# Patient Record
Sex: FEMALE | Race: BLACK OR AFRICAN AMERICAN | NOT HISPANIC OR LATINO | Employment: FULL TIME | URBAN - METROPOLITAN AREA
[De-identification: names, ages, dates, MRNs, and addresses within clinical notes are randomized per-mention and may not be internally consistent; named-entity substitution may affect disease eponyms.]

---

## 2017-01-18 ENCOUNTER — OFFICE VISIT (OUTPATIENT)
Dept: NEUROSURGERY | Facility: CLINIC | Age: 22
End: 2017-01-18

## 2017-01-18 VITALS
HEIGHT: 62 IN | BODY MASS INDEX: 29.44 KG/M2 | HEART RATE: 82 BPM | SYSTOLIC BLOOD PRESSURE: 128 MMHG | DIASTOLIC BLOOD PRESSURE: 82 MMHG | WEIGHT: 160 LBS

## 2017-01-18 DIAGNOSIS — M50.30 DISC DISEASE, DEGENERATIVE, CERVICAL: Primary | ICD-10-CM

## 2017-01-18 DIAGNOSIS — R93.7 ABNORMAL MRI, SPINE: ICD-10-CM

## 2017-01-18 PROCEDURE — 99213 OFFICE O/P EST LOW 20 MIN: CPT | Performed by: NEUROLOGICAL SURGERY

## 2017-01-18 NOTE — PROGRESS NOTES
Subjective   Patient ID: Ruth Garcia is a 21 y.o. female is here today for follow-up of Chiari malformation with new MRI of the cervical spine.    At her last visit, physical therapy was recommended.     Today, she notes that she went for initial PT evaluation and notes that she does HEP at home.  She notes that she is not currently having neck pain issues at this time.     Neck Pain    This is a chronic problem. The problem has been resolved. Pertinent negatives include no fever.       The following portions of the patient's history were reviewed and updated as appropriate: allergies, current medications, past family history, past medical history, past social history, past surgical history and problem list.    Review of Systems   Constitutional: Negative for fever.   Musculoskeletal: Positive for neck pain.   Neurological: Negative for syncope.   All other systems reviewed and are negative.    There was suspicion that this patient might have a true syrinx.  The repeat MRI was reviewed with the patient.  It really hasn't changed since 2013.  The linear streak may just represent an enlarged central canal but not a true syrinx.  It was always an incidental finding anyway.  The reason for her neck pain and intermittent numbness and tingling was the disc disease which also was unchanged.  She is actually asymptomatic right now the symptoms seem to have resolved on her own.  She went to physical therapy briefly that showed her exercises including the chin tuck.  I encouraged her to continue this.  I reassured her that the enlarged central canal was not anything to worry about.  But I did tell her that symptoms could recur in the neck and significant way in the future.  If they do, she needs to come back to see me.  Otherwise we can keep it open-ended.      Objective   Physical Exam   Constitutional: She is oriented to person, place, and time. She appears well-developed and well-nourished.   HENT:   Head: Normocephalic  and atraumatic.   Eyes: Conjunctivae and EOM are normal. Pupils are equal, round, and reactive to light.   Fundoscopic exam:       The right eye shows no papilledema. The right eye shows venous pulsations.        The left eye shows no papilledema. The left eye shows venous pulsations.   Neck: Carotid bruit is not present.   Neurological: She is oriented to person, place, and time. She has a normal Finger-Nose-Finger Test and a normal Heel to Shin Test. Gait normal.   Reflex Scores:       Tricep reflexes are 2+ on the right side and 2+ on the left side.       Bicep reflexes are 2+ on the right side and 2+ on the left side.       Brachioradialis reflexes are 2+ on the right side and 2+ on the left side.       Patellar reflexes are 2+ on the right side and 2+ on the left side.       Achilles reflexes are 2+ on the right side and 2+ on the left side.  Psychiatric: Her speech is normal.     Neurologic Exam     Mental Status   Oriented to person, place, and time.   Registration of memory: Good recent and remote memory.   Attention: normal. Concentration: normal.   Speech: speech is normal   Level of consciousness: alert  Knowledge: consistent with education.     Cranial Nerves     CN II   Visual fields full to confrontation.   Visual acuity: normal    CN III, IV, VI   Pupils are equal, round, and reactive to light.  Extraocular motions are normal.     CN V   Facial sensation intact.   Right corneal reflex: normal  Left corneal reflex: normal    CN VII   Facial expression full, symmetric.   Right facial weakness: none  Left facial weakness: none    CN VIII   Hearing: intact    CN IX, X   Palate: symmetric    CN XI   Right sternocleidomastoid strength: normal  Left sternocleidomastoid strength: normal    CN XII   Tongue: not atrophic  Tongue deviation: none    Motor Exam   Muscle bulk: normal  Right arm tone: normal  Left arm tone: normal  Right leg tone: normal  Left leg tone: normal    Strength   Strength 5/5 except as  noted.     Sensory Exam   Light touch normal.     Gait, Coordination, and Reflexes     Gait  Gait: normal    Coordination   Finger to nose coordination: normal  Heel to shin coordination: normal    Reflexes   Right brachioradialis: 2+  Left brachioradialis: 2+  Right biceps: 2+  Left biceps: 2+  Right triceps: 2+  Left triceps: 2+  Right patellar: 2+  Left patellar: 2+  Right achilles: 2+  Left achilles: 2+  Right : 2+  Left : 2+      Assessment/Plan   Independent Review of Radiographic Studies:    The recently completed brain MRI was normal.  There is no Chiari malformation.  The cervical MRI did show some disc disease at C3-C4 and a linear streak at the C6-C7 level that didn't really need to criteria for an actual syrinx.  It may be a slightly enlarged central canal.  In any event there was actually no change since 2013 MRI.  I agree with the reports.      Medical Decision Making:    I reassured her about the linear streak and that she doesn't have a syrinx.  I don't think we need to routinely follow this on MRI.  As for the symptoms associated with the disc disease, she is asymptomatic right now.  I told her though symptoms could recur and if they do in a significant way, she needs to return to the office.  Otherwise I encouraged her to continue the exercises that she learned in therapy and do them on a daily basis.      Ruth was seen today for chiari malformation and neck pain.    Diagnoses and all orders for this visit:    Disc disease, degenerative, cervical    Abnormal MRI, spine    No Follow-up on file.

## 2017-01-18 NOTE — LETTER
January 18, 2017     TEMITOPE Dumont  [address]  [address]  [facility] [address]    Patient: Ruth Garcia   YOB: 1995   Date of Visit: 1/18/2017       Dear TEMITOPE Matthews:    Ruth Garcia was in my office today. Below are the relevant portions of my assessment and plan of care.      Independent Review of Radiographic Studies:    The recently completed brain MRI was normal.  There is no Chiari malformation.  The cervical MRI did show some disc disease at C3-C4 and a linear streak at the C6-C7 level that didn't really need to criteria for an actual syrinx.  It may be a slightly enlarged central canal.  In any event there was actually no change since 2013 MRI.  I agree with the reports.      Medical Decision Making:    I reassured her about the linear streak and that she doesn't have a syrinx.  I don't think we need to routinely follow this on MRI.  As for the symptoms associated with the disc disease, she is asymptomatic right now.  I told her though symptoms could recur and if they do in a significant way, she needs to return to the office.  Otherwise I encouraged her to continue the exercises that she learned in therapy and do them on a daily basis.      Ruth was seen today for chiari malformation and neck pain.    Diagnoses and all orders for this visit:    Disc disease, degenerative, cervical    Abnormal MRI, spine    No Follow-up on file.            If you have questions, please do not hesitate to call me. I look forward to following Ruth along with you.         Sincerely,        Faustino Dejesus MD        CC: No Recipients

## 2017-03-28 ENCOUNTER — OFFICE VISIT (OUTPATIENT)
Dept: INTERNAL MEDICINE | Facility: CLINIC | Age: 22
End: 2017-03-28

## 2017-03-28 VITALS
OXYGEN SATURATION: 98 % | DIASTOLIC BLOOD PRESSURE: 76 MMHG | HEART RATE: 84 BPM | SYSTOLIC BLOOD PRESSURE: 110 MMHG | WEIGHT: 182 LBS | HEIGHT: 62 IN | BODY MASS INDEX: 33.49 KG/M2

## 2017-03-28 DIAGNOSIS — F32.A DEPRESSION, UNSPECIFIED DEPRESSION TYPE: ICD-10-CM

## 2017-03-28 DIAGNOSIS — F41.9 ANXIETY: ICD-10-CM

## 2017-03-28 PROCEDURE — 99213 OFFICE O/P EST LOW 20 MIN: CPT | Performed by: NURSE PRACTITIONER

## 2017-03-28 RX ORDER — ALPRAZOLAM 0.5 MG/1
0.5 TABLET ORAL NIGHTLY PRN
Qty: 30 TABLET | Refills: 0 | Status: SHIPPED | OUTPATIENT
Start: 2017-03-28 | End: 2019-05-23 | Stop reason: SDUPTHER

## 2017-03-28 NOTE — PROGRESS NOTES
Subjective   Ruth Garcia is a 21 y.o. female. Patient is here today for   Chief Complaint   Patient presents with   • Anxiety     Pt here to follow up on anxiety/depression. Pt doing well. Pt requesting rf on zoloft 50mg once daily & xanax 0.5mg once as needed.    .    History of Present Illness   Patient is here to f/u on anxiety and depression. She is currently on zoloft 50 mg and going to counseling once a month at this time. Her counselor suggested possibly increasing her dosage, but the patient feels fine with the dosage that she is on. She uses xanax on a rare occasion and is needing a refill on that today. Denies problems with sleep, appetite.     The following portions of the patient's history were reviewed and updated as appropriate: allergies, current medications, past family history, past medical history, past social history, past surgical history and problem list.    Review of Systems   Constitutional: Negative.    Respiratory: Negative.    Cardiovascular: Negative.    Psychiatric/Behavioral: Negative for dysphoric mood, self-injury, sleep disturbance and suicidal ideas. The patient is not nervous/anxious.        Objective   Vitals:    03/28/17 0803   BP: 110/76   Pulse: 84   SpO2: 98%     Physical Exam   Constitutional: Vital signs are normal. She appears well-developed and well-nourished.   Cardiovascular: Normal rate, regular rhythm and normal heart sounds.    Pulmonary/Chest: Effort normal and breath sounds normal.   Skin: Skin is warm and dry.   Psychiatric: She has a normal mood and affect. Her speech is normal and behavior is normal. Thought content normal.   Nursing note and vitals reviewed.      Assessment/Plan   Ruth was seen today for anxiety.    Diagnoses and all orders for this visit:    Depression, unspecified depression type  -     sertraline (ZOLOFT) 50 MG tablet; Take 1 tablet by mouth Daily.    Anxiety  -     sertraline (ZOLOFT) 50 MG tablet; Take 1 tablet by mouth Daily.  -      ALPRAZolam (XANAX) 0.5 MG tablet; Take 1 tablet by mouth At Night As Needed for Anxiety.      As part of this patient's treatment plan I am prescribing controlled substances.  The patient has been made aware of appropriate use of such medications, including potential risk of somnolence. Limited ability to drive and/or work safely, and potential for dependence or overdose.  It has also been made clear that these medications are for use by this patient only, without concomitant use of alcohol or other substances unless prescribed.  [facility] report has been reviewed and scanned into the patient's chart.

## 2018-02-20 DIAGNOSIS — F41.9 ANXIETY: ICD-10-CM

## 2018-02-20 DIAGNOSIS — F32.A DEPRESSION, UNSPECIFIED DEPRESSION TYPE: ICD-10-CM

## 2018-03-28 ENCOUNTER — OFFICE VISIT (OUTPATIENT)
Dept: INTERNAL MEDICINE | Facility: CLINIC | Age: 23
End: 2018-03-28

## 2018-03-28 VITALS
WEIGHT: 192 LBS | BODY MASS INDEX: 35.33 KG/M2 | DIASTOLIC BLOOD PRESSURE: 72 MMHG | HEIGHT: 62 IN | HEART RATE: 86 BPM | OXYGEN SATURATION: 98 % | SYSTOLIC BLOOD PRESSURE: 102 MMHG | TEMPERATURE: 98.8 F

## 2018-03-28 DIAGNOSIS — F32.A DEPRESSION, UNSPECIFIED DEPRESSION TYPE: Primary | ICD-10-CM

## 2018-03-28 DIAGNOSIS — Z00.00 HEALTHCARE MAINTENANCE: ICD-10-CM

## 2018-03-28 DIAGNOSIS — F41.9 ANXIETY: ICD-10-CM

## 2018-03-28 LAB
25(OH)D3+25(OH)D2 SERPL-MCNC: 19.1 NG/ML (ref 30–100)
ALBUMIN SERPL-MCNC: 4.2 G/DL (ref 3.5–5.2)
ALBUMIN/GLOB SERPL: 1.2 G/DL
ALP SERPL-CCNC: 68 U/L (ref 39–117)
ALT SERPL-CCNC: 12 U/L (ref 1–33)
AST SERPL-CCNC: 18 U/L (ref 1–32)
BILIRUB SERPL-MCNC: 0.2 MG/DL (ref 0.1–1.2)
BUN SERPL-MCNC: 10 MG/DL (ref 6–20)
BUN/CREAT SERPL: 11.1 (ref 7–25)
CALCIUM SERPL-MCNC: 9.1 MG/DL (ref 8.6–10.5)
CHLORIDE SERPL-SCNC: 103 MMOL/L (ref 98–107)
CHOLEST SERPL-MCNC: 196 MG/DL (ref 0–200)
CHOLEST/HDLC SERPL: 4.17 {RATIO}
CO2 SERPL-SCNC: 26.9 MMOL/L (ref 22–29)
CREAT SERPL-MCNC: 0.9 MG/DL (ref 0.57–1)
GFR SERPLBLD CREATININE-BSD FMLA CKD-EPI: 77 ML/MIN/1.73
GFR SERPLBLD CREATININE-BSD FMLA CKD-EPI: 93 ML/MIN/1.73
GLOBULIN SER CALC-MCNC: 3.4 GM/DL
GLUCOSE SERPL-MCNC: 84 MG/DL (ref 65–99)
HDLC SERPL-MCNC: 47 MG/DL (ref 40–60)
LDLC SERPL CALC-MCNC: 133 MG/DL (ref 0–100)
POTASSIUM SERPL-SCNC: 5 MMOL/L (ref 3.5–5.2)
PROT SERPL-MCNC: 7.6 G/DL (ref 6–8.5)
SODIUM SERPL-SCNC: 141 MMOL/L (ref 136–145)
TRIGL SERPL-MCNC: 81 MG/DL (ref 0–150)
VLDLC SERPL CALC-MCNC: 16.2 MG/DL (ref 5–40)

## 2018-03-28 PROCEDURE — 99213 OFFICE O/P EST LOW 20 MIN: CPT | Performed by: NURSE PRACTITIONER

## 2018-03-28 NOTE — PROGRESS NOTES
Subjective   Ruth Garcia is a 22 y.o. female. Patient is here today for   Chief Complaint   Patient presents with   • Anxiety     Pt here to follow up on Anxiety/Depression. Pt does not need a rf on any of her meds at this time.    .    History of Present Illness   Patient is here to follow up on anxiety and depression. She is doing well with Zoloft. She hasn't had to take any xanax. Denies any other concerns.     She is fasting today for labs.    The following portions of the patient's history were reviewed and updated as appropriate: allergies, current medications, past family history, past medical history, past social history, past surgical history and problem list.    Review of Systems   Constitutional: Negative.    HENT: Negative.    Respiratory: Negative.    Cardiovascular: Negative.    Psychiatric/Behavioral: Negative.        Objective   Vitals:    03/28/18 0743   BP: 102/72   Pulse: 86   Temp: 98.8 °F (37.1 °C)   SpO2: 98%     Physical Exam   Constitutional: Vital signs are normal. She appears well-developed and well-nourished.   HENT:   Right Ear: Tympanic membrane and ear canal normal.   Left Ear: Tympanic membrane and ear canal normal.   Mouth/Throat: Oropharynx is clear and moist and mucous membranes are normal.   Cardiovascular: Normal rate, regular rhythm and normal heart sounds.    Pulmonary/Chest: Effort normal and breath sounds normal.   Skin: Skin is warm, dry and intact.   Psychiatric: She has a normal mood and affect. Her speech is normal and behavior is normal. Thought content normal.   Nursing note and vitals reviewed.      Assessment/Plan   Ruth was seen today for anxiety.    Diagnoses and all orders for this visit:    Depression, unspecified depression type    Anxiety    Healthcare maintenance  -     Comprehensive Metabolic Panel  -     Lipid Panel With / Chol / HDL Ratio  -     Vitamin D 25 Hydroxy      Patient doesn't need refills at this time. Will call with lab results. F/u in one  year.

## 2018-04-13 ENCOUNTER — TELEPHONE (OUTPATIENT)
Dept: INTERNAL MEDICINE | Facility: CLINIC | Age: 23
End: 2018-04-13

## 2018-04-13 DIAGNOSIS — R12 HEART BURN: Primary | ICD-10-CM

## 2018-04-13 DIAGNOSIS — K30 ACID INDIGESTION: ICD-10-CM

## 2018-04-13 NOTE — TELEPHONE ENCOUNTER
Pt called requesting to be referred to GI for indigestion & heartburn for the past 3-4 days. Pt has been trying something OTC but does still want to go forward with the referral for the time being.

## 2018-04-13 NOTE — TELEPHONE ENCOUNTER
----- Message from Jina Bourne sent at 4/13/2018  9:27 AM EDT -----  PT wants refferal for a Gi.     PHONE: [phone]

## 2018-05-02 ENCOUNTER — OFFICE VISIT (OUTPATIENT)
Dept: GASTROENTEROLOGY | Facility: CLINIC | Age: 23
End: 2018-05-02

## 2018-05-02 VITALS
TEMPERATURE: 98.2 F | WEIGHT: 190.8 LBS | SYSTOLIC BLOOD PRESSURE: 128 MMHG | HEIGHT: 62 IN | DIASTOLIC BLOOD PRESSURE: 84 MMHG | BODY MASS INDEX: 35.11 KG/M2

## 2018-05-02 DIAGNOSIS — R12 HEARTBURN: Primary | ICD-10-CM

## 2018-05-02 PROCEDURE — 99203 OFFICE O/P NEW LOW 30 MIN: CPT | Performed by: INTERNAL MEDICINE

## 2018-05-02 RX ORDER — OMEPRAZOLE 20 MG/1
20 CAPSULE, DELAYED RELEASE ORAL DAILY
COMMUNITY
End: 2019-05-24

## 2018-05-02 NOTE — PATIENT INSTRUCTIONS
Monitor symptoms    Gaviscon is a good antacid to use as needed    Pepcid is good as needed for more severe symptoms    Call if symptoms return or worsen, can check an esophagram (x-ray test)    For any additional questions, concerns or changes to your condition after today's office visit please contact the office at [phone].

## 2018-05-02 NOTE — PROGRESS NOTES
"Chief Complaint   Patient presents with   • Heartburn       Subjective     HPI    Ruth Garcia is a 22 y.o. female with a past medical history noted below who presents for evaluation of heartburn.  Symptoms started about 2 weeks ago.  She has had intermittent symptoms before but they usually resolved within a day and without any medication.  This time, symptoms lasted about 5 days.  Describes mid esophageal pain with swallowing.  Described as a \"punching\" sensation.  Worse with laying down but also during the day following meals.  Almost unbearable to her.  Associated with a strong urge to burp.  Felt that liquid was hitting something in her mid chest.  No associated nausea or vomiting.  No dysphagia or regurgitation  She started prilosec OTC and symptoms gradually resolved  She completed 2 weeks of this.    No excess NSAIDs.  No family history of GI malignancies.  No excessive ETOH.  Works as a  at [employer].  No abdominal surgeries.          Past Medical History:   Diagnosis Date   • Anxiety    • Depression          Current Outpatient Prescriptions:   •  ALPRAZolam (XANAX) 0.5 MG tablet, Take 1 tablet by mouth At Night As Needed for Anxiety., Disp: 30 tablet, Rfl: 0  •  norgestrel-ethinyl estradiol (LOW-OGESTREL,CRYSELLE) 0.3-30 MG-MCG per tablet, Take 1 tablet by mouth Daily., Disp: , Rfl:   •  omeprazole (priLOSEC) 20 MG capsule, Take 20 mg by mouth Daily., Disp: , Rfl:   •  sertraline (ZOLOFT) 50 MG tablet, TAKE 1 TABLET BY MOUTH DAILY., Disp: 90 tablet, Rfl: 0    No Known Allergies    Social History     Social History   • Marital status: Single     Spouse name: N/A   • Number of children: N/A   • Years of education: N/A     Occupational History   • Not on file.     Social History Main Topics   • Smoking status: Never Smoker   • Smokeless tobacco: Never Used   • Alcohol use Yes      Comment: RARE   • Drug use: No   • Sexual activity: Not on file     Other Topics Concern   • Not on file "     Social History Narrative   • No narrative on file       Family History   Problem Relation Age of Onset   • No Known Problems Mother    • No Known Problems Father        Review of Systems   Constitutional: Negative for activity change, appetite change and fatigue.   HENT: Negative for sore throat and trouble swallowing.    Respiratory: Negative.    Cardiovascular: Negative.    Gastrointestinal: Negative for abdominal distention, abdominal pain and blood in stool.        + heartburn   Endocrine: Negative for cold intolerance and heat intolerance.   Genitourinary: Negative for difficulty urinating, dysuria and frequency.   Musculoskeletal: Negative for arthralgias, back pain and myalgias.   Skin: Negative.    Hematological: Negative for adenopathy. Does not bruise/bleed easily.   All other systems reviewed and are negative.      Objective     Vitals:    05/02/18 1543   BP: 128/84   Temp: 98.2 °F (36.8 °C)     1    05/02/18  1543   Weight: 86.5 kg (190 lb 12.8 oz)     Body mass index is 34.9 kg/m².    Physical Exam   Constitutional: She is oriented to person, place, and time. She appears well-developed and well-nourished. No distress.   HENT:   Head: Normocephalic and atraumatic.   Right Ear: External ear normal.   Left Ear: External ear normal.   Nose: Nose normal.   Mouth/Throat: Oropharynx is clear and moist.   Eyes: Conjunctivae and EOM are normal. Right eye exhibits no discharge. Left eye exhibits no discharge. No scleral icterus.   Neck: Normal range of motion. Neck supple. No thyromegaly present.   No supraclavicular adenopathy   Cardiovascular: Normal rate, regular rhythm, normal heart sounds and intact distal pulses.  Exam reveals no gallop.    No murmur heard.  No lower extremity edema   Pulmonary/Chest: Effort normal and breath sounds normal. No respiratory distress. She has no wheezes.   Abdominal: Soft. Normal appearance and bowel sounds are normal. She exhibits no distension and no mass. There is no  hepatosplenomegaly. There is no tenderness. There is no rigidity, no rebound and no guarding. No hernia.   Genitourinary:   Genitourinary Comments: Rectal exam deferred   Musculoskeletal: Normal range of motion. She exhibits no edema or tenderness.   No atrophy of upper or lower extremities.  Normal digits and nails of both hands.   Lymphadenopathy:     She has no cervical adenopathy.   Neurological: She is alert and oriented to person, place, and time. She displays no atrophy. Coordination normal.   Skin: Skin is warm and dry. No rash noted. She is not diaphoretic. No erythema.   Psychiatric: She has a normal mood and affect. Her behavior is normal. Judgment and thought content normal.   Vitals reviewed.      WBC   Date Value Ref Range Status   09/29/2016 5.29 4.50 - 10.70 10*3/mm3 Final     RBC   Date Value Ref Range Status   09/29/2016 4.60 3.90 - 5.20 10*6/mm3 Final     Hemoglobin   Date Value Ref Range Status   09/29/2016 12.8 11.9 - 15.5 g/dL Final     Hematocrit   Date Value Ref Range Status   09/29/2016 38.7 35.6 - 45.5 % Final     MCV   Date Value Ref Range Status   09/29/2016 84.1 80.5 - 98.2 fL Final     MCH   Date Value Ref Range Status   09/29/2016 27.8 26.9 - 32.7 pg Final     MCHC   Date Value Ref Range Status   09/29/2016 33.1 32.4 - 36.3 g/dL Final     RDW   Date Value Ref Range Status   09/29/2016 14.3 (H) 11.7 - 13.0 % Final     Platelets   Date Value Ref Range Status   09/29/2016 256 140 - 500 10*3/mm3 Final     Neutrophil Rel %   Date Value Ref Range Status   09/29/2016 49.7 42.7 - 76.0 % Final     Lymphocyte Rel %   Date Value Ref Range Status   09/29/2016 32.9 19.6 - 45.3 % Final     Monocyte Rel %   Date Value Ref Range Status   09/29/2016 12.5 (H) 5.0 - 12.0 % Final     Eosinophil Rel %   Date Value Ref Range Status   09/29/2016 4.5 0.3 - 6.2 % Final     Basophil Rel %   Date Value Ref Range Status   09/29/2016 0.4 0.0 - 1.5 % Final     Neutrophils Absolute   Date Value Ref Range Status    09/29/2016 2.63 1.90 - 8.10 10*3/mm3 Final     Lymphocytes Absolute   Date Value Ref Range Status   09/29/2016 1.74 0.90 - 4.80 10*3/mm3 Final     Monocytes Absolute   Date Value Ref Range Status   09/29/2016 0.66 0.20 - 1.20 10*3/mm3 Final     Eosinophils Absolute   Date Value Ref Range Status   09/29/2016 0.24 0.00 - 0.70 10*3/mm3 Final     Basophils Absolute   Date Value Ref Range Status   09/29/2016 0.02 0.00 - 0.20 10*3/mm3 Final       Sodium   Date Value Ref Range Status   03/28/2018 141 136 - 145 mmol/L Final     Potassium   Date Value Ref Range Status   03/28/2018 5.0 3.5 - 5.2 mmol/L Final     Total CO2   Date Value Ref Range Status   03/28/2018 26.9 22.0 - 29.0 mmol/L Final     Chloride   Date Value Ref Range Status   03/28/2018 103 98 - 107 mmol/L Final     Creatinine   Date Value Ref Range Status   03/28/2018 0.90 0.57 - 1.00 mg/dL Final     BUN   Date Value Ref Range Status   03/28/2018 10 6 - 20 mg/dL Final     BUN/Creatinine Ratio   Date Value Ref Range Status   03/28/2018 11.1 7.0 - 25.0 Final     Calcium   Date Value Ref Range Status   03/28/2018 9.1 8.6 - 10.5 mg/dL Final     eGFR Non  Am   Date Value Ref Range Status   03/28/2018 77 >60 mL/min/1.73 Final     Alkaline Phosphatase   Date Value Ref Range Status   03/28/2018 68 39 - 117 U/L Final     ALT (SGPT)   Date Value Ref Range Status   03/28/2018 12 1 - 33 U/L Final     AST (SGOT)   Date Value Ref Range Status   03/28/2018 18 1 - 32 U/L Final     Total Bilirubin   Date Value Ref Range Status   03/28/2018 0.2 0.1 - 1.2 mg/dL Final     Albumin   Date Value Ref Range Status   03/28/2018 4.20 3.50 - 5.20 g/dL Final     A/G Ratio   Date Value Ref Range Status   03/28/2018 1.2 g/dL Final         Imaging Results (last 7 days)     ** No results found for the last 168 hours. **            No notes on file    Assessment/Plan    1. Heartburn: acute, severe episode.  Resolved after ppi.  No alarm features    Plan  -offered option of esophagram for  further evaluation vs monitoring for symptoms, opts to monitor  -advised gaviscon, pepcid for any as needed symptom control  -to call if symptoms return or worsen  -follow up as needed    Ruth was seen today for heartburn.    Diagnoses and all orders for this visit:    Heartburn        I have discussed the above plan with the patient.  They verbalize understanding and are in agreement with the plan.  They have been advised to contact the office for any questions, concerns, or changes related to their health.    Dictated utilizing Dragon dictation

## 2018-05-23 DIAGNOSIS — F41.9 ANXIETY: ICD-10-CM

## 2018-05-23 DIAGNOSIS — F32.A DEPRESSION, UNSPECIFIED DEPRESSION TYPE: ICD-10-CM

## 2019-02-19 DIAGNOSIS — F32.A DEPRESSION, UNSPECIFIED DEPRESSION TYPE: ICD-10-CM

## 2019-02-19 DIAGNOSIS — F41.9 ANXIETY: ICD-10-CM

## 2019-05-14 DIAGNOSIS — F41.9 ANXIETY: ICD-10-CM

## 2019-05-14 DIAGNOSIS — F32.A DEPRESSION, UNSPECIFIED DEPRESSION TYPE: ICD-10-CM

## 2019-05-23 DIAGNOSIS — F32.A DEPRESSION, UNSPECIFIED DEPRESSION TYPE: ICD-10-CM

## 2019-05-23 DIAGNOSIS — F41.9 ANXIETY: ICD-10-CM

## 2019-05-23 RX ORDER — ALPRAZOLAM 0.5 MG/1
0.5 TABLET ORAL NIGHTLY PRN
Qty: 30 TABLET | Refills: 0 | Status: SHIPPED | OUTPATIENT
Start: 2019-05-23 | End: 2020-03-24 | Stop reason: SDUPTHER

## 2019-05-23 NOTE — TELEPHONE ENCOUNTER
----- Message from Jina Harris sent at 5/23/2019  9:11 AM EDT -----  Pt is scheduled for tomorrow.   ----- Message -----  From: Farhana Larsen MA  Sent: 5/23/2019   8:48 AM  To: Jina Harris    Can you please call the patient back to schedule? She needs a f/u appt since its been over a year.    ----- Message -----  From: Fernanda Cole RegSched Rep  Sent: 5/23/2019   8:41 AM  To: Farhana Larsen MA    Pt called to get 90 day refills on   ALPRAZolam (XANAX) 0.5 MG tablet  sertraline (ZOLOFT) 50 MG tablet    [facility]

## 2019-05-24 ENCOUNTER — OFFICE VISIT (OUTPATIENT)
Dept: INTERNAL MEDICINE | Facility: CLINIC | Age: 24
End: 2019-05-24

## 2019-05-24 VITALS
HEIGHT: 62 IN | BODY MASS INDEX: 35.33 KG/M2 | HEART RATE: 100 BPM | WEIGHT: 192 LBS | DIASTOLIC BLOOD PRESSURE: 68 MMHG | OXYGEN SATURATION: 98 % | SYSTOLIC BLOOD PRESSURE: 106 MMHG

## 2019-05-24 DIAGNOSIS — F41.9 ANXIETY: Primary | ICD-10-CM

## 2019-05-24 DIAGNOSIS — Z00.00 HEALTHCARE MAINTENANCE: ICD-10-CM

## 2019-05-24 DIAGNOSIS — Z23 NEED FOR TDAP VACCINATION: ICD-10-CM

## 2019-05-24 PROCEDURE — 90471 IMMUNIZATION ADMIN: CPT | Performed by: NURSE PRACTITIONER

## 2019-05-24 PROCEDURE — 90715 TDAP VACCINE 7 YRS/> IM: CPT | Performed by: NURSE PRACTITIONER

## 2019-05-24 PROCEDURE — 99213 OFFICE O/P EST LOW 20 MIN: CPT | Performed by: NURSE PRACTITIONER

## 2019-05-24 NOTE — PROGRESS NOTES
Subjective   Ruth Garcia is a 23 y.o. female. Patient is here today for   Chief Complaint   Patient presents with   • Anxiety     Pt here to follow up on Anxiety/Dep.    .    History of Present Illness   Patient is here to follow up on anxiety and depression. She is on sertraline daily and rarely takes alprazolam.  Denies any concerns at this time.    The following portions of the patient's history were reviewed and updated as appropriate: allergies, current medications, past family history, past medical history, past social history, past surgical history and problem list.    Review of Systems   Constitutional: Negative.    Respiratory: Negative.    Cardiovascular: Negative.    Psychiatric/Behavioral: Negative.        Objective   Vitals:    05/24/19 0755   BP: 106/68   Pulse: 100   SpO2: 98%     Physical Exam   Constitutional: She is oriented to person, place, and time. Vital signs are normal. She appears well-developed and well-nourished.   Cardiovascular: Normal rate, regular rhythm and normal heart sounds.   Pulmonary/Chest: Effort normal and breath sounds normal.   Neurological: She is alert and oriented to person, place, and time.   Skin: Skin is warm, dry and intact.   Psychiatric: She has a normal mood and affect. Her speech is normal and behavior is normal. Thought content normal.   Nursing note and vitals reviewed.      Assessment/Plan   Ruth was seen today for anxiety.    Diagnoses and all orders for this visit:    Anxiety      Current Outpatient Medications:   •  ALPRAZolam (XANAX) 0.5 MG tablet, Take 1 tablet by mouth At Night As Needed for Anxiety., Disp: 30 tablet, Rfl: 0  •  norgestrel-ethinyl estradiol (LOW-OGESTREL,CRYSELLE) 0.3-30 MG-MCG per tablet, Take 1 tablet by mouth Daily., Disp: , Rfl:   •  sertraline (ZOLOFT) 50 MG tablet, Take 1 tablet by mouth Daily., Disp: 90 tablet, Rfl: 1    As part of this patient's treatment plan I am prescribing controlled substances.  The patient has been made  aware of appropriate use of such medications, including potential risk of somnolence. Limited ability to drive and/or work safely, and potential for dependence or overdose.  It has also been made clear that these medications are for use by this patient only, without concomitant use of alcohol or other substances unless prescribed.  [facility] report has been reviewed and scanned into the patient's chart.

## 2019-11-30 DIAGNOSIS — F41.9 ANXIETY: ICD-10-CM

## 2019-11-30 DIAGNOSIS — F32.A DEPRESSION, UNSPECIFIED DEPRESSION TYPE: ICD-10-CM

## 2020-03-24 DIAGNOSIS — F41.9 ANXIETY: ICD-10-CM

## 2020-03-24 RX ORDER — ALPRAZOLAM 0.5 MG/1
0.5 TABLET ORAL NIGHTLY PRN
Qty: 30 TABLET | Refills: 0 | Status: SHIPPED | OUTPATIENT
Start: 2020-03-24

## 2020-03-24 NOTE — TELEPHONE ENCOUNTER
----- Message from Puja Pennington sent at 3/24/2020 12:17 PM EDT -----  Regarding: MED REFILL  Patient needs a refill for xanax sent to [facility]. Thanks